# Patient Record
Sex: FEMALE | Race: BLACK OR AFRICAN AMERICAN | NOT HISPANIC OR LATINO | URBAN - METROPOLITAN AREA
[De-identification: names, ages, dates, MRNs, and addresses within clinical notes are randomized per-mention and may not be internally consistent; named-entity substitution may affect disease eponyms.]

---

## 2020-07-25 ENCOUNTER — TELEPHONE ENCOUNTER (OUTPATIENT)
Dept: URBAN - METROPOLITAN AREA CLINIC 13 | Facility: CLINIC | Age: 65
End: 2020-07-25

## 2020-07-25 RX ORDER — DICLOFENAC SODIUM 1 %
APPLY SPARINGLY TO AFFECTED AREA(S) ONCE DAILY GEL (GRAM) TOPICAL
Refills: 0 | OUTPATIENT
Start: 2015-11-11 | End: 2015-12-04

## 2020-07-25 RX ORDER — POLYETHYLENE GLYCOL 3350, SODIUM CHLORIDE, SODIUM BICARBONATE AND POTASSIUM CHLORIDE WITH LEMON FLAVOR 420; 11.2; 5.72; 1.48 G/4L; G/4L; G/4L; G/4L
TAKE 1/2 GALLON AT 5:00 PM DAY BEFORE PROCEDURE, TAKE SECOND 1/2 OF GALLON 6 HRS PRIOR TO PROCEDURE POWDER, FOR SOLUTION ORAL
Qty: 1 | Refills: 0 | OUTPATIENT
Start: 2015-11-11 | End: 2015-12-04

## 2020-07-26 ENCOUNTER — TELEPHONE ENCOUNTER (OUTPATIENT)
Dept: URBAN - METROPOLITAN AREA CLINIC 13 | Facility: CLINIC | Age: 65
End: 2020-07-26

## 2020-07-26 RX ORDER — AMLODIPINE BESYLATE 10 MG/1
TAKE 1 TABLET DAILY FOR BLOOD PRESSURE TABLET ORAL
Refills: 0 | Status: ACTIVE | COMMUNITY
Start: 2015-11-11

## 2020-07-26 RX ORDER — METFORMIN HYDROCHLORIDE 1000 MG/1
TAKE 1 TABLET EVERY 12 HOURS TABLET, COATED ORAL
Refills: 0 | Status: ACTIVE | COMMUNITY
Start: 2015-11-11

## 2020-07-26 RX ORDER — METOPROLOL TARTRATE 100 MG/1
TAKE 1 TABLET TWICE DAILY TABLET, FILM COATED ORAL
Refills: 0 | Status: ACTIVE | COMMUNITY
Start: 2015-11-11

## 2020-07-26 RX ORDER — INSULIN HUMAN 100 [IU]/ML
INJECT 50 UNITS SQ EVERY MORNING,40 UNITS SQ EVENING INJECTION, SUSPENSION SUBCUTANEOUS
Refills: 0 | Status: ACTIVE | COMMUNITY
Start: 2015-11-11

## 2020-07-26 RX ORDER — LISINOPRIL AND HYDROCHLOROTHIAZIDE TABLETS 20; 25 MG/1; MG/1
TAKE 1 TABLET ONCE DAILY TABLET ORAL
Refills: 0 | Status: ACTIVE | COMMUNITY
Start: 2015-11-11

## 2020-07-26 RX ORDER — SIMVASTATIN 20 MG/1
TAKE 1 TABLET DAILY IN THE EVENING TABLET, FILM COATED ORAL
Refills: 0 | Status: ACTIVE | COMMUNITY
Start: 2015-11-11

## 2020-07-26 RX ORDER — INSULIN GLARGINE 100 [IU]/ML
INJECT 10 UNITS SUB Q IN THE EVENING INJECTION, SOLUTION SUBCUTANEOUS
Refills: 0 | Status: ACTIVE | COMMUNITY
Start: 2015-11-11

## 2020-11-16 ENCOUNTER — CLAIMS CREATED FROM THE CLAIM WINDOW (OUTPATIENT)
Dept: URBAN - METROPOLITAN AREA MEDICAL CENTER 19 | Facility: MEDICAL CENTER | Age: 65
End: 2020-11-16
Payer: MEDICARE

## 2020-11-16 DIAGNOSIS — K52.9 NONINFECTIVE GASTROENTERITIS AND COLITIS, UNSPECIFIED: ICD-10-CM

## 2020-11-16 DIAGNOSIS — R19.7 DIARRHEA, UNSPECIFIED: ICD-10-CM

## 2020-11-16 DIAGNOSIS — K62.5 HEMORRHAGE OF ANUS AND RECTUM: ICD-10-CM

## 2020-11-16 DIAGNOSIS — R11.2 NAUSEA WITH VOMITING, UNSPECIFIED: ICD-10-CM

## 2020-11-16 PROCEDURE — 99221 1ST HOSP IP/OBS SF/LOW 40: CPT | Performed by: INTERNAL MEDICINE

## 2020-12-22 ENCOUNTER — WEB ENCOUNTER (OUTPATIENT)
Dept: URBAN - METROPOLITAN AREA CLINIC 113 | Facility: CLINIC | Age: 65
End: 2020-12-22

## 2020-12-22 ENCOUNTER — OFFICE VISIT (OUTPATIENT)
Dept: URBAN - METROPOLITAN AREA CLINIC 113 | Facility: CLINIC | Age: 65
End: 2020-12-22
Payer: MEDICARE

## 2020-12-22 ENCOUNTER — TELEPHONE ENCOUNTER (OUTPATIENT)
Dept: URBAN - METROPOLITAN AREA CLINIC 113 | Facility: CLINIC | Age: 65
End: 2020-12-22

## 2020-12-22 ENCOUNTER — LAB OUTSIDE AN ENCOUNTER (OUTPATIENT)
Dept: URBAN - METROPOLITAN AREA CLINIC 113 | Facility: CLINIC | Age: 65
End: 2020-12-22

## 2020-12-22 VITALS
BODY MASS INDEX: 41.64 KG/M2 | DIASTOLIC BLOOD PRESSURE: 77 MMHG | HEART RATE: 82 BPM | SYSTOLIC BLOOD PRESSURE: 145 MMHG | WEIGHT: 235 LBS | RESPIRATION RATE: 20 BRPM | HEIGHT: 63 IN | TEMPERATURE: 98.8 F

## 2020-12-22 DIAGNOSIS — K55.9 ISCHEMIC COLITIS: ICD-10-CM

## 2020-12-22 DIAGNOSIS — D50.0 IRON DEFICIENCY ANEMIA DUE TO CHRONIC BLOOD LOSS: ICD-10-CM

## 2020-12-22 DIAGNOSIS — K59.01 SLOW TRANSIT CONSTIPATION: ICD-10-CM

## 2020-12-22 PROBLEM — 30588004: Status: ACTIVE | Noted: 2020-12-22

## 2020-12-22 PROCEDURE — 99213 OFFICE O/P EST LOW 20 MIN: CPT | Performed by: INTERNAL MEDICINE

## 2020-12-22 RX ORDER — LISINOPRIL 40 MG/1
1 TABLET TABLET ORAL ONCE A DAY
Status: ACTIVE | COMMUNITY

## 2020-12-22 RX ORDER — METFORMIN HYDROCHLORIDE 1000 MG/1
TAKE 1 TABLET EVERY 12 HOURS TABLET, COATED ORAL
Refills: 0 | Status: DISCONTINUED | COMMUNITY
Start: 2015-11-11

## 2020-12-22 RX ORDER — METOPROLOL TARTRATE 100 MG/1
TAKE 1 TABLET TWICE DAILY TABLET, FILM COATED ORAL
Refills: 0 | Status: ACTIVE | COMMUNITY
Start: 2015-11-11

## 2020-12-22 RX ORDER — HYDRALAZINE HYDROCHLORIDE 25 MG/1
1 TABLET WITH FOOD TABLET, FILM COATED ORAL THREE TIMES A DAY
Status: ACTIVE | COMMUNITY

## 2020-12-22 RX ORDER — SEMAGLUTIDE 1.34 MG/ML
AS DIRECTED INJECTION, SOLUTION SUBCUTANEOUS
Status: ACTIVE | COMMUNITY

## 2020-12-22 RX ORDER — IRON POLYSACCHARIDE COMPLEX 150 MG
1 CAPSULE CAPSULE ORAL ONCE A DAY
Status: ACTIVE | COMMUNITY

## 2020-12-22 RX ORDER — DORZOLAMIDE HYDROCHLORIDE AND TIMOLOL MALEATE 20; 5 MG/ML; MG/ML
1 DROP INTO AFFECTED EYE SOLUTION/ DROPS OPHTHALMIC TWICE A DAY
Status: ACTIVE | COMMUNITY

## 2020-12-22 RX ORDER — AMLODIPINE BESYLATE 10 MG/1
TAKE 1 TABLET DAILY FOR BLOOD PRESSURE TABLET ORAL
Refills: 0 | Status: DISCONTINUED | COMMUNITY
Start: 2015-11-11

## 2020-12-22 RX ORDER — SIMVASTATIN 20 MG/1
TAKE 1 TABLET DAILY IN THE EVENING TABLET, FILM COATED ORAL
Refills: 0 | Status: DISCONTINUED | COMMUNITY
Start: 2015-11-11

## 2020-12-22 RX ORDER — INSULIN GLARGINE 100 [IU]/ML
INJECT 10 UNITS SUB Q IN THE EVENING INJECTION, SOLUTION SUBCUTANEOUS
Refills: 0 | Status: DISCONTINUED | COMMUNITY
Start: 2015-11-11

## 2020-12-22 RX ORDER — ATORVASTATIN CALCIUM 20 MG/1
1 TABLET TABLET, FILM COATED ORAL ONCE A DAY
Status: ACTIVE | COMMUNITY

## 2020-12-22 RX ORDER — GABAPENTIN 600 MG/1
1 TABLET TABLET, FILM COATED ORAL ONCE A DAY
Status: ACTIVE | COMMUNITY

## 2020-12-22 RX ORDER — FUROSEMIDE 40 MG/1
1 TABLET TABLET ORAL
Status: ACTIVE | COMMUNITY

## 2020-12-22 RX ORDER — ERGOCALCIFEROL 1.25 MG/1
1 CAPSULE CAPSULE, LIQUID FILLED ORAL
Status: ACTIVE | COMMUNITY

## 2020-12-22 RX ORDER — ICOSAPENT ETHYL 1000 MG/1
2 CAPSULES WITH MEALS CAPSULE ORAL TWICE A DAY
Status: ACTIVE | COMMUNITY

## 2020-12-22 RX ORDER — FLUTICASONE PROPIONATE 50 UG/1
1 SPRAY IN EACH NOSTRIL SPRAY, METERED NASAL ONCE A DAY
Status: ACTIVE | COMMUNITY

## 2020-12-22 RX ORDER — INSULIN HUMAN 100 [IU]/ML
INJECT 50 UNITS SQ EVERY MORNING,40 UNITS SQ EVENING INJECTION, SUSPENSION SUBCUTANEOUS
Refills: 0 | Status: DISCONTINUED | COMMUNITY
Start: 2015-11-11

## 2020-12-22 RX ORDER — HYDROCHLOROTHIAZIDE 25 MG/1
1 TABLET IN THE MORNING TABLET ORAL ONCE A DAY
Status: DISCONTINUED | COMMUNITY

## 2020-12-22 RX ORDER — PANTOPRAZOLE SODIUM 40 MG/1
1 TABLET TABLET, DELAYED RELEASE ORAL ONCE A DAY
Status: ACTIVE | COMMUNITY

## 2020-12-22 RX ORDER — INSULIN LISPRO 100 [IU]/ML
AS DIRECTED INJECTION, SUSPENSION SUBCUTANEOUS
Status: ACTIVE | COMMUNITY

## 2020-12-22 RX ORDER — LATANOPROST 50 UG/ML
1 DROP INTO AFFECTED EYE IN THE EVENING SOLUTION/ DROPS OPHTHALMIC ONCE A DAY
Status: ACTIVE | COMMUNITY

## 2020-12-22 RX ORDER — POLYETHYLENE GLYCOL 3350 17 G/17G
AS DIRECTED POWDER, FOR SOLUTION ORAL
Status: ACTIVE | COMMUNITY

## 2020-12-22 RX ORDER — LISINOPRIL AND HYDROCHLOROTHIAZIDE TABLETS 20; 25 MG/1; MG/1
TAKE 1 TABLET ONCE DAILY TABLET ORAL
Refills: 0 | Status: DISCONTINUED | COMMUNITY
Start: 2015-11-11

## 2020-12-22 NOTE — EXAM-PHYSICAL EXAM
She is alert and oriented to person place and situation in no acute distress.  She does have a pale appearance.  There is no scleral icterus. She is alert and oriented to person place and situation in no acute distress.  She does have a pale appearance.  There is no scleral icterus.

## 2020-12-22 NOTE — HPI-TODAY'S VISIT:
Patient is a very pleasant 65-year-old female who I saw in the hospital with an acute gastroenteritis on top of constipation likely creating an ischemic colitis.  At the time I had seen her she completely resolved her symptoms.  She did have the incidental finding of significant anemia with a low MCV.  She states she has had some blood work since discharge in November but we do not have that.  Her last colonoscopy was performed by Dr. Alonzo 5 years ago and revealed diverticulosis otherwise unremarkable exam.  She is taking MiraLAX for her constipation and this works very well.  She is not having any diarrhea with this.  She is on pantoprazole daily.  She has no abdominal pain. Patient is a very pleasant 65-year-old female who I saw in the hospital with an acute gastroenteritis on top of constipation likely creating an ischemic colitis.  At the time I had seen her she completely resolved her symptoms.  She did have the incidental finding of significant anemia with a low MCV.  She states she has had some blood work since discharge in November but we do not have that.  Her last colonoscopy was performed by Dr. Alonzo 5 years ago and revealed diverticulosis otherwise unremarkable exam.  She is taking MiraLAX for her constipation and this works very well.  She is not having any diarrhea with this.  She is on pantoprazole daily.  She has no abdominal pain.

## 2021-01-19 ENCOUNTER — OFFICE VISIT (OUTPATIENT)
Dept: URBAN - METROPOLITAN AREA SURGERY CENTER 25 | Facility: SURGERY CENTER | Age: 66
End: 2021-01-19
Payer: MEDICARE

## 2021-01-19 ENCOUNTER — CLAIMS CREATED FROM THE CLAIM WINDOW (OUTPATIENT)
Dept: URBAN - METROPOLITAN AREA CLINIC 4 | Facility: CLINIC | Age: 66
End: 2021-01-19
Payer: MEDICARE

## 2021-01-19 DIAGNOSIS — D50.9 ANEMIA, IRON DEFICIENCY: ICD-10-CM

## 2021-01-19 DIAGNOSIS — C88.4 EXTRANODAL MARGINAL ZONE B-CELL LYMPHOMA OF MUCOSA-ASSOCIATED LYMPHOID TISSUE [MALT-LYMPHOMA]: ICD-10-CM

## 2021-01-19 DIAGNOSIS — K29.60 ADENOPAPILLOMATOSIS GASTRICA: ICD-10-CM

## 2021-01-19 DIAGNOSIS — K29.40 ATROPHIC GASTRITIS: ICD-10-CM

## 2021-01-19 PROCEDURE — 43239 EGD BIOPSY SINGLE/MULTIPLE: CPT | Performed by: INTERNAL MEDICINE

## 2021-01-19 PROCEDURE — G8907 PT DOC NO EVENTS ON DISCHARG: HCPCS | Performed by: INTERNAL MEDICINE

## 2021-01-19 PROCEDURE — 88305 TISSUE EXAM BY PATHOLOGIST: CPT | Performed by: PATHOLOGY

## 2021-01-19 PROCEDURE — 88341 IMHCHEM/IMCYTCHM EA ADD ANTB: CPT | Performed by: PATHOLOGY

## 2021-01-19 PROCEDURE — 88342 IMHCHEM/IMCYTCHM 1ST ANTB: CPT | Performed by: PATHOLOGY

## 2021-01-19 RX ORDER — FLUTICASONE PROPIONATE 50 UG/1
1 SPRAY IN EACH NOSTRIL SPRAY, METERED NASAL ONCE A DAY
Status: ACTIVE | COMMUNITY

## 2021-01-19 RX ORDER — LATANOPROST 50 UG/ML
1 DROP INTO AFFECTED EYE IN THE EVENING SOLUTION/ DROPS OPHTHALMIC ONCE A DAY
Status: ACTIVE | COMMUNITY

## 2021-01-19 RX ORDER — FUROSEMIDE 40 MG/1
1 TABLET TABLET ORAL
Status: ACTIVE | COMMUNITY

## 2021-01-19 RX ORDER — ATORVASTATIN CALCIUM 20 MG/1
1 TABLET TABLET, FILM COATED ORAL ONCE A DAY
Status: ACTIVE | COMMUNITY

## 2021-01-19 RX ORDER — DORZOLAMIDE HYDROCHLORIDE AND TIMOLOL MALEATE 20; 5 MG/ML; MG/ML
1 DROP INTO AFFECTED EYE SOLUTION/ DROPS OPHTHALMIC TWICE A DAY
Status: ACTIVE | COMMUNITY

## 2021-01-19 RX ORDER — POLYETHYLENE GLYCOL 3350 17 G/17G
AS DIRECTED POWDER, FOR SOLUTION ORAL
Status: ACTIVE | COMMUNITY

## 2021-01-19 RX ORDER — HYDRALAZINE HYDROCHLORIDE 25 MG/1
1 TABLET WITH FOOD TABLET, FILM COATED ORAL THREE TIMES A DAY
Status: ACTIVE | COMMUNITY

## 2021-01-19 RX ORDER — INSULIN LISPRO 100 [IU]/ML
AS DIRECTED INJECTION, SUSPENSION SUBCUTANEOUS
Status: ACTIVE | COMMUNITY

## 2021-01-19 RX ORDER — SEMAGLUTIDE 1.34 MG/ML
AS DIRECTED INJECTION, SOLUTION SUBCUTANEOUS
Status: ACTIVE | COMMUNITY

## 2021-01-19 RX ORDER — LISINOPRIL 40 MG/1
1 TABLET TABLET ORAL ONCE A DAY
Status: ACTIVE | COMMUNITY

## 2021-01-19 RX ORDER — ICOSAPENT ETHYL 1000 MG/1
2 CAPSULES WITH MEALS CAPSULE ORAL TWICE A DAY
Status: ACTIVE | COMMUNITY

## 2021-01-19 RX ORDER — ERGOCALCIFEROL 1.25 MG/1
1 CAPSULE CAPSULE, LIQUID FILLED ORAL
Status: ACTIVE | COMMUNITY

## 2021-01-19 RX ORDER — PANTOPRAZOLE SODIUM 40 MG/1
1 TABLET TABLET, DELAYED RELEASE ORAL ONCE A DAY
Status: ACTIVE | COMMUNITY

## 2021-01-19 RX ORDER — GABAPENTIN 600 MG/1
1 TABLET TABLET, FILM COATED ORAL ONCE A DAY
Status: ACTIVE | COMMUNITY

## 2021-01-19 RX ORDER — IRON POLYSACCHARIDE COMPLEX 150 MG
1 CAPSULE CAPSULE ORAL ONCE A DAY
Status: ACTIVE | COMMUNITY

## 2021-01-19 RX ORDER — METOPROLOL TARTRATE 100 MG/1
TAKE 1 TABLET TWICE DAILY TABLET, FILM COATED ORAL
Refills: 0 | Status: ACTIVE | COMMUNITY
Start: 2015-11-11

## 2021-02-25 ENCOUNTER — OFFICE VISIT (OUTPATIENT)
Dept: URBAN - METROPOLITAN AREA CLINIC 107 | Facility: CLINIC | Age: 66
End: 2021-02-25

## 2021-04-21 ENCOUNTER — LAB OUTSIDE AN ENCOUNTER (OUTPATIENT)
Dept: URBAN - METROPOLITAN AREA CLINIC 113 | Facility: CLINIC | Age: 66
End: 2021-04-21

## 2021-04-21 ENCOUNTER — DASHBOARD ENCOUNTERS (OUTPATIENT)
Age: 66
End: 2021-04-21

## 2021-04-21 ENCOUNTER — OFFICE VISIT (OUTPATIENT)
Dept: URBAN - METROPOLITAN AREA CLINIC 113 | Facility: CLINIC | Age: 66
End: 2021-04-21
Payer: MEDICARE

## 2021-04-21 ENCOUNTER — TELEPHONE ENCOUNTER (OUTPATIENT)
Dept: URBAN - METROPOLITAN AREA CLINIC 113 | Facility: CLINIC | Age: 66
End: 2021-04-21

## 2021-04-21 VITALS
BODY MASS INDEX: 41.64 KG/M2 | TEMPERATURE: 97.6 F | WEIGHT: 235 LBS | DIASTOLIC BLOOD PRESSURE: 80 MMHG | HEIGHT: 63 IN | SYSTOLIC BLOOD PRESSURE: 144 MMHG | HEART RATE: 76 BPM | RESPIRATION RATE: 18 BRPM

## 2021-04-21 DIAGNOSIS — K59.01 SLOW TRANSIT CONSTIPATION: ICD-10-CM

## 2021-04-21 DIAGNOSIS — R11.2 NAUSEA WITH VOMITING, UNSPECIFIED: ICD-10-CM

## 2021-04-21 DIAGNOSIS — K29.40 ATROPHIC GASTRITIS WITHOUT HEMORRHAGE: ICD-10-CM

## 2021-04-21 DIAGNOSIS — K31.89 INTESTINAL METAPLASIA OF GASTRIC MUCOSA: ICD-10-CM

## 2021-04-21 DIAGNOSIS — D50.0 IRON DEFICIENCY ANEMIA DUE TO CHRONIC BLOOD LOSS: ICD-10-CM

## 2021-04-21 PROBLEM — 84568007: Status: ACTIVE | Noted: 2021-04-21

## 2021-04-21 PROBLEM — 35298007: Status: ACTIVE | Noted: 2020-12-22

## 2021-04-21 PROCEDURE — 99213 OFFICE O/P EST LOW 20 MIN: CPT | Performed by: INTERNAL MEDICINE

## 2021-04-21 RX ORDER — METOPROLOL TARTRATE 100 MG/1
TAKE 1 TABLET TWICE DAILY TABLET, FILM COATED ORAL
Refills: 0 | Status: ACTIVE | COMMUNITY
Start: 2015-11-11

## 2021-04-21 RX ORDER — FUROSEMIDE 40 MG/1
1 TABLET TABLET ORAL
Status: ACTIVE | COMMUNITY

## 2021-04-21 RX ORDER — LATANOPROST 50 UG/ML
1 DROP INTO AFFECTED EYE IN THE EVENING SOLUTION/ DROPS OPHTHALMIC ONCE A DAY
Status: ACTIVE | COMMUNITY

## 2021-04-21 RX ORDER — DORZOLAMIDE HYDROCHLORIDE AND TIMOLOL MALEATE 20; 5 MG/ML; MG/ML
1 DROP INTO AFFECTED EYE SOLUTION/ DROPS OPHTHALMIC TWICE A DAY
Status: ACTIVE | COMMUNITY

## 2021-04-21 RX ORDER — FLUTICASONE PROPIONATE 50 UG/1
1 SPRAY IN EACH NOSTRIL SPRAY, METERED NASAL ONCE A DAY
Status: ACTIVE | COMMUNITY

## 2021-04-21 RX ORDER — POLYETHYLENE GLYCOL 3350 17 G/17G
AS DIRECTED POWDER, FOR SOLUTION ORAL
Status: ACTIVE | COMMUNITY

## 2021-04-21 RX ORDER — LISINOPRIL 40 MG/1
1 TABLET TABLET ORAL ONCE A DAY
Status: ACTIVE | COMMUNITY

## 2021-04-21 RX ORDER — SEMAGLUTIDE 1.34 MG/ML
AS DIRECTED INJECTION, SOLUTION SUBCUTANEOUS
Status: ACTIVE | COMMUNITY

## 2021-04-21 RX ORDER — IRON POLYSACCHARIDE COMPLEX 150 MG
1 CAPSULE CAPSULE ORAL ONCE A DAY
Status: ACTIVE | COMMUNITY

## 2021-04-21 RX ORDER — HYDRALAZINE HYDROCHLORIDE 25 MG/1
1 TABLET WITH FOOD TABLET, FILM COATED ORAL THREE TIMES A DAY
Status: ACTIVE | COMMUNITY

## 2021-04-21 RX ORDER — ERGOCALCIFEROL 1.25 MG/1
1 CAPSULE CAPSULE, LIQUID FILLED ORAL
Status: ACTIVE | COMMUNITY

## 2021-04-21 RX ORDER — PANTOPRAZOLE SODIUM 40 MG/1
1 TABLET TABLET, DELAYED RELEASE ORAL ONCE A DAY
Status: ACTIVE | COMMUNITY

## 2021-04-21 RX ORDER — SODIUM PICOSULFATE, MAGNESIUM OXIDE, AND ANHYDROUS CITRIC ACID 10; 3.5; 12 MG/160ML; G/160ML; G/160ML
160 ML LIQUID ORAL
Qty: 160 ML | Refills: 0 | OUTPATIENT
Start: 2021-04-21 | End: 2021-04-22

## 2021-04-21 RX ORDER — ATORVASTATIN CALCIUM 20 MG/1
1 TABLET TABLET, FILM COATED ORAL ONCE A DAY
Status: ACTIVE | COMMUNITY

## 2021-04-21 RX ORDER — ICOSAPENT ETHYL 1000 MG/1
2 CAPSULES WITH MEALS CAPSULE ORAL TWICE A DAY
Status: ACTIVE | COMMUNITY

## 2021-04-21 RX ORDER — GABAPENTIN 600 MG/1
1 TABLET TABLET, FILM COATED ORAL ONCE A DAY
Status: ACTIVE | COMMUNITY

## 2021-04-21 RX ORDER — INSULIN LISPRO 100 [IU]/ML
AS DIRECTED INJECTION, SUSPENSION SUBCUTANEOUS
Status: ACTIVE | COMMUNITY

## 2021-04-21 NOTE — HPI-TODAY'S VISIT:
Patient is a very pleasant 65-year-old female who I saw in the hospital with an acute gastroenteritis on top of constipation likely creating an ischemic colitis.  At the time I had seen her she completely resolved her symptoms.  She did have the incidental finding of significant anemia with a low MCV.  She states she has had some blood work since discharge in November but we do not have that.  Her last colonoscopy was performed by Dr. Alonzo 5 years ago and revealed diverticulosis otherwise unremarkable exam.  She is taking MiraLAX for her constipation and this works very well.  She is not having any diarrhea with this.  She is on pantoprazole daily.  She has no abdominal pain. Upper endoscopy performed January 19 was unremarkable.  Biopsies of the stomach revealed multifocal atrophic gastritis with focal intestinal metaplasia.  No H. pylori was seen.  Iron studies revealed a borderline low percent saturation at 16.7 but normal ferritin.  Hemoglobin 10.2 with MCv of 77. Patient states she is doing well other than inability to lose weight.  She takes an iron tablet 1 day/week.  Statement Selected

## 2021-05-12 PROBLEM — 724556004: Status: ACTIVE | Noted: 2020-12-22

## 2021-06-23 ENCOUNTER — OFFICE VISIT (OUTPATIENT)
Dept: URBAN - METROPOLITAN AREA SURGERY CENTER 25 | Facility: SURGERY CENTER | Age: 66
End: 2021-06-23
Payer: MEDICARE

## 2021-06-23 ENCOUNTER — CLAIMS CREATED FROM THE CLAIM WINDOW (OUTPATIENT)
Dept: URBAN - METROPOLITAN AREA CLINIC 4 | Facility: CLINIC | Age: 66
End: 2021-06-23
Payer: MEDICARE

## 2021-06-23 DIAGNOSIS — D12.2 BENIGN NEOPLASM OF ASCENDING COLON: ICD-10-CM

## 2021-06-23 DIAGNOSIS — D12.8 ADENOMATOUS POLYP OF RECTUM: ICD-10-CM

## 2021-06-23 DIAGNOSIS — D12.4 BENIGN NEOPLASM OF DESCENDING COLON: ICD-10-CM

## 2021-06-23 DIAGNOSIS — D12.8 BENIGN NEOPLASM OF RECTUM: ICD-10-CM

## 2021-06-23 DIAGNOSIS — D50.0 ANEMIA DUE TO BLOOD LOSS: ICD-10-CM

## 2021-06-23 DIAGNOSIS — D12.4 ADENOMA OF DESCENDING COLON: ICD-10-CM

## 2021-06-23 DIAGNOSIS — D12.2 ADENOMA OF ASCENDING COLON: ICD-10-CM

## 2021-06-23 PROCEDURE — 88305 TISSUE EXAM BY PATHOLOGIST: CPT | Performed by: PATHOLOGY

## 2021-06-23 PROCEDURE — 45385 COLONOSCOPY W/LESION REMOVAL: CPT | Performed by: INTERNAL MEDICINE

## 2021-06-23 PROCEDURE — G8907 PT DOC NO EVENTS ON DISCHARG: HCPCS | Performed by: INTERNAL MEDICINE

## 2021-06-23 RX ORDER — DORZOLAMIDE HYDROCHLORIDE AND TIMOLOL MALEATE 20; 5 MG/ML; MG/ML
1 DROP INTO AFFECTED EYE SOLUTION/ DROPS OPHTHALMIC TWICE A DAY
Status: ACTIVE | COMMUNITY

## 2021-06-23 RX ORDER — FLUTICASONE PROPIONATE 50 UG/1
1 SPRAY IN EACH NOSTRIL SPRAY, METERED NASAL ONCE A DAY
Status: ACTIVE | COMMUNITY

## 2021-06-23 RX ORDER — SEMAGLUTIDE 1.34 MG/ML
AS DIRECTED INJECTION, SOLUTION SUBCUTANEOUS
Status: ACTIVE | COMMUNITY

## 2021-06-23 RX ORDER — FUROSEMIDE 40 MG/1
1 TABLET TABLET ORAL
Status: ACTIVE | COMMUNITY

## 2021-06-23 RX ORDER — METOPROLOL TARTRATE 100 MG/1
TAKE 1 TABLET TWICE DAILY TABLET, FILM COATED ORAL
Refills: 0 | Status: ACTIVE | COMMUNITY
Start: 2015-11-11

## 2021-06-23 RX ORDER — IRON POLYSACCHARIDE COMPLEX 150 MG
1 CAPSULE CAPSULE ORAL ONCE A DAY
Status: ACTIVE | COMMUNITY

## 2021-06-23 RX ORDER — ATORVASTATIN CALCIUM 20 MG/1
1 TABLET TABLET, FILM COATED ORAL ONCE A DAY
Status: ACTIVE | COMMUNITY

## 2021-06-23 RX ORDER — ERGOCALCIFEROL 1.25 MG/1
1 CAPSULE CAPSULE, LIQUID FILLED ORAL
Status: ACTIVE | COMMUNITY

## 2021-06-23 RX ORDER — ICOSAPENT ETHYL 1000 MG/1
2 CAPSULES WITH MEALS CAPSULE ORAL TWICE A DAY
Status: ACTIVE | COMMUNITY

## 2021-06-23 RX ORDER — LISINOPRIL 40 MG/1
1 TABLET TABLET ORAL ONCE A DAY
Status: ACTIVE | COMMUNITY

## 2021-06-23 RX ORDER — HYDRALAZINE HYDROCHLORIDE 25 MG/1
1 TABLET WITH FOOD TABLET, FILM COATED ORAL THREE TIMES A DAY
Status: ACTIVE | COMMUNITY

## 2021-06-23 RX ORDER — INSULIN LISPRO 100 [IU]/ML
AS DIRECTED INJECTION, SUSPENSION SUBCUTANEOUS
Status: ACTIVE | COMMUNITY

## 2021-06-23 RX ORDER — GABAPENTIN 600 MG/1
1 TABLET TABLET, FILM COATED ORAL ONCE A DAY
Status: ACTIVE | COMMUNITY

## 2021-06-23 RX ORDER — PANTOPRAZOLE SODIUM 40 MG/1
1 TABLET TABLET, DELAYED RELEASE ORAL ONCE A DAY
Status: ACTIVE | COMMUNITY

## 2021-06-23 RX ORDER — LATANOPROST 50 UG/ML
1 DROP INTO AFFECTED EYE IN THE EVENING SOLUTION/ DROPS OPHTHALMIC ONCE A DAY
Status: ACTIVE | COMMUNITY

## 2021-06-23 RX ORDER — POLYETHYLENE GLYCOL 3350 17 G/17G
AS DIRECTED POWDER, FOR SOLUTION ORAL
Status: ACTIVE | COMMUNITY

## 2021-07-19 ENCOUNTER — OFFICE VISIT (OUTPATIENT)
Dept: URBAN - METROPOLITAN AREA CLINIC 113 | Facility: CLINIC | Age: 66
End: 2021-07-19

## 2021-09-09 ENCOUNTER — OFFICE VISIT (OUTPATIENT)
Dept: URBAN - METROPOLITAN AREA CLINIC 113 | Facility: CLINIC | Age: 66
End: 2021-09-09